# Patient Record
(demographics unavailable — no encounter records)

---

## 2025-04-30 NOTE — REASON FOR VISIT
[Initial Evaluation] : an initial evaluation of [FreeTextEntry3] : R/O HTN  [Patient] : patient [Mother] : mother

## 2025-04-30 NOTE — CONSULT LETTER
[Dear  ___:] : Dear Dr. [unfilled]: [FreeTextEntry4] : Kimberly Malagon MD [FreeTextEntry5] :  [address]  [FreeTextEntry6] : [place], [id number] [de-identified] : Alban Brasher DO, MPH Pediatric Cardiologist [facility]